# Patient Record
Sex: FEMALE | Race: WHITE | Employment: PART TIME | URBAN - METROPOLITAN AREA
[De-identification: names, ages, dates, MRNs, and addresses within clinical notes are randomized per-mention and may not be internally consistent; named-entity substitution may affect disease eponyms.]

---

## 2017-02-01 NOTE — PROGRESS NOTES
Here for Routine Annual Exam  No concerns or questions. Menses regular, no concerns. Contraception- spouse had vasectomy  No concerns or questions.  Patient and family gong to [place] to ski next month, otherwise just keeping busy with kids activities

## 2018-09-28 NOTE — PROGRESS NOTES
Here for Routine Annual Exam  No concerns or questions. Menses are regular +/- a few days. Contraception- vasectomy  No C/O    ROS: No Cardiac, Respiratory, GI,  or Neurological symptoms.     PE:  GENERAL: well developed, well nourished, in no appar

## 2019-05-09 NOTE — TELEPHONE ENCOUNTER
Called patient from [facility] report to verify if still a Dr Virgil Madsen patient, no answer/ lm on vm.

## 2019-09-30 NOTE — PROGRESS NOTES
Here for Routine Annual Exam  No concerns or questions. Menses are regular, no concern. Contraception- not active. No C/O    ROS: No Cardiac, Respiratory, GI,  or Neurological symptoms.     PE:  GENERAL: well developed, well nourished, in no appare

## 2020-01-24 PROBLEM — N76.4 LEFT GENITAL LABIAL ABSCESS: Status: ACTIVE | Noted: 2020-01-24

## 2020-01-24 NOTE — PROGRESS NOTES
Painful labial cyst, started three days ago  Has had them before  She tried to squeeze it last night, became worse  No fever    ROS: No Cardiac, Respiratory, GI,  or Neurological symptoms.     PE:  Negative node survey  Left labia swollen, erythema all of

## 2020-01-25 NOTE — PROGRESS NOTES
Vulva still swollen  No fever    ROS: No Cardiac, Respiratory, GI,  or Neurological symptoms.     PE:  Left vulva swollen, wick came out  Erythema improved, now limited to vulva    Hot compresses, continue antibiotics    Will return Monday

## 2020-02-03 NOTE — PROGRESS NOTES
Follow up  Much better  Only 1-2 cm induration at original abscess site  No erythema  Finished antibiotics, will refill  x1 and continue

## 2020-08-09 PROBLEM — D64.9 ANEMIA: Status: ACTIVE | Noted: 2020-08-09

## 2020-08-09 PROBLEM — R57.8 HEMORRHAGIC SHOCK (HCC): Status: ACTIVE | Noted: 2020-08-09

## 2020-08-09 PROBLEM — O00.90 RUPTURED ECTOPIC PREGNANCY (HCC): Status: ACTIVE | Noted: 2020-08-09

## 2020-08-09 NOTE — ED INITIAL ASSESSMENT (HPI)
Pt , c/o severe lower abdominal cramping and shoulder pain, mostly right shoulder, and dizzy. Pt took pregnancy test last night and it was positive.  Last menstural period was

## 2020-08-09 NOTE — ANESTHESIA PREPROCEDURE EVALUATION
PRE-OP EVALUATION    Patient Name: Elvia Ayala    Pre-op Diagnosis: Ectopic pregnancy [O00.90]    Procedure(s):  Laparoscopic Salpingectomy possible Oopherectomy for Ectopic pregnancy    Surgeon(s) and Role:     Narcisa Stinson MD - Primary    Pr weekly      Drug use: No     Available pre-op labs reviewed.   Lab Results   Component Value Date    WBC 13.8 (H) 08/09/2020    RBC 3.18 (L) 08/09/2020    HGB 9.9 (L) 08/09/2020    HCT 28.6 (L) 08/09/2020    MCV 89.9 08/09/2020    MCH 31.1 08/09/2020    MCH

## 2020-08-09 NOTE — ANESTHESIA PROCEDURE NOTES
Airway  Date/Time: 8/9/2020 6:00 PM  Urgency: elective      General Information and Staff    Patient location during procedure: OR  Anesthesiologist: Zina Moran MD  Performed: anesthesiologist     Indications and Patient Condition  Indications for ai

## 2020-08-09 NOTE — ED NOTES
Efforts in place to keep PT comfortable, warm blankets, warming lights, increased room temp. Pt given phone to call her Mother, per PT request. Pt remains A&Ox4, breathing non labored, fluids and RBC running through 2 PIV's.  Bilateral side rails elevated &

## 2020-08-09 NOTE — H&P
[address] [facility] Patient Status:  Emergency    1980 MRN [id number]   Location [facility] Attending Drea Hahn MD   Hosp Day # 0 PCP Zaria Beck MD     SUBJECTIVE:    Reason for data in the 24 hours ending 08/09/20 8082    Physical Exam:  General: Alert, orientated x3. .  Vital Signs:  Blood pressure 104/78, pulse 87, temperature 98.4 °F (36.9 °C), temperature source Temporal, resp.  rate 18, height 62\", weight 115 lb (52.2 kg), l to GI and  tracts. All questions were answered.     Naida Garibay  8/9/2020  5:04 PM

## 2020-08-09 NOTE — ANESTHESIA POSTPROCEDURE EVALUATION
[address] Patient Status:  Emergency   Age/Gender 45year old female MRN [id number]   Location [facility] Attending Zuleika Ivy MD   Hosp Day # 0 PCP Ángel Corona DO       Anesthesia Post-op Note

## 2020-08-09 NOTE — ED PROVIDER NOTES
Patient Seen in: [facility] Emergency Department      History   Patient presents with:  Abdomen/Flank Pain    Stated Complaint: positive preg test, lower abdomen pain, appears pale.      HPI    52-year-old woman denies any medical history, here with breath sounds. No wheezing, rhonchi or rales. Abdominal: Diffuse lower abdominal tenderness with rebound tenderness, no guarding.     Skin: Pale, dry  Neurological: Awake alert, speech is normal        ED Course     Labs Reviewed   COMP METABOLIC PANEL (1 results for these tests on the individual orders.    ABORH (BLOOD TYPE)   ANTIBODY SCREEN   PREPARE RBC   PREPARE RBC   RH IMMUNE GLOBULIN   RAINBOW DRAW BLUE   RAINBOW DRAW LAVENDER   RAINBOW DRAW LIGHT GREEN   RAINBOW DRAW GOLD     EKG    Rate, intervals of ruptured ectopic pregnancy with free fluid and debris in the pelvis. O- blood ordered 2 units Case was discussed with OB Dr. Elisa Melendrez was contacted. RhoGam ordered, patient will be admitted for surgery and further evaluation.   Admission dispositio

## 2020-08-10 NOTE — OPERATIVE REPORT
PREOPERATIVE DIAGNOSIS:  Ruptured right ectopic    POSTOPERATIVE DIAGNOSIS:  Same pathology pending, active hemorrhaging right ovary, hemoperitoneum                    PROCEDURE PERFORMED: Operative Laparoscopic removal of Adnexal   Dilation and curettage within the peritoneal cavity without trauma to the underlying viscera. Next a  5 mm port was placed in the left  and a 11 mm port in the right lower quadrants in avascular areas. Plume-Away was then attached to the trocar.       Systemic review of the pe

## 2020-08-10 NOTE — PROGRESS NOTES
[facility]  Progress Note    Gopal Sheffield Patient Status:  Inpatient    1980 MRN [id number]   [facility] 3NW-A Attending Benoit Traylor MD   Hosp Day # 1 PCP Rachel Florentino DO     Subjective:  POD: 1 - feels good.   No va

## 2020-08-10 NOTE — PROGRESS NOTES
Pt is alert and oriented x4. Lungs are clear bilaterally on room air. Bowel sounds are hypoactive. Denies nausea. Tolerating clear liquids at this time. Lap sites x3 to the abdomen. Skin glue CDI. Paint score 2/10. Declining pain meds at this time.  IVF inf

## 2020-08-10 NOTE — BRIEF OP NOTE
Pre-Operative Diagnosis: Ectopic pregnancy [O00.90]     Post-Operative Diagnosis: Ectopic pregnancy [O00.90] pathology pending, possible ovarian ectopic  hemoperitoneum     Procedure Performed:   Procedure(s):  Laparoscopic Salpingooophorectomy for Ectopic

## 2020-08-10 NOTE — DISCHARGE SUMMARY
[facility]  Discharge Summary    Ellen Buckner Patient Status:  Inpatient    1980 MRN [id number]   [facility] 3NW-A Attending Barb Perez MD   Hosp Day # 1 PCP Isela Jason DO     Date of Admission: 2020    Date

## 2020-08-10 NOTE — PROGRESS NOTES
NURSING DISCHARGE NOTE    Discharged Home via Wheelchair. Accompanied by Family member  Belongings Taken by patient/family. PT D/C VIA WHEELCHAIR IN STABLE CONDITION. REVIEWED D/C INSTRUCTIONS W/PT.  LET PT KNOW HER RX WAS E-PRESCRIBED TO HER PHARMA

## 2020-08-10 NOTE — PLAN OF CARE
Problem: Patient/Family Goals  Goal: Patient/Family Long Term Goal  Description  Patient's Long Term Goal: DISCHARGE    Interventions:  - COMMUNICATE ANY NEEDS  -TOLERATE ADVANCED DIET  -AMBULATE TID  - See additional Care Plan goals for specific interve

## 2020-08-11 NOTE — PAYOR COMM NOTE
--------------  PLEASE FAX INPT DAYS AUTHORIZED ALONG WITH NRD [phone]    [facility] YOU    ADMISSION REVIEW     Payor: [facility]  Subscriber #:  [id number]  Authorization Number: [id number]    Admit date: 8/9/20  Admit time: 2006       Admitting Physi Vitals signs and nursing note reviewed. General: Ill-appearing pale young woman sitting in the bed   head: Normocephalic and atraumatic. HEENT:  Mucous membranes are moist.   Cardiovascular:  Normal rate and regular rhythm.   No Edema  Pulmonary:  Pulmo This is a 29-year-old woman here with diffuse lower abdominal pain fairly sudden onset earlier this morning she is 5 weeks pregnant. On arrival she is hypotensive to 80s over 40s, she is pale and ill-appearing.   Bedside ultrasound shows free fluid in ASKIM HEENT: Exam is unremarkable. Without scleral icterus. Mucous membranes are moist. Pupils are equal and round, reactive to light and accommodate. Oropharynx is clear. Neck: No tenderness to palpitation.   Full range of motion to flexion and extension, la ESTIMATED BLOOD LOSS:  10 cc 3000cc hemoperitoneum     Urine output 400cc     INDICATIONS:  See H&P     FINDINGS:  3000cc hemoperitoneum, normal appearing tubes, actively hemorrhaging right ovary, normal left ovary, normal uterus  Uterus sounded to 9 cm    Systemic review of the pelvic anatomy was performed. The lower and upper pelvis was filled with blood. Suction was done and 3000 cc was removed. At this time survey of the pelvis revealed a normal uterus,normal left tube and ovary.  Normal appearing right t Vital Signs:  Blood pressure 98/40, pulse 72, temperature 97.9 °F (36.6 °C), temperature source Oral, resp. rate 17, height 62\", weight 115 lb (52.2 kg), last menstrual period 07/05/2020, SpO2 98 %, not currently breastfeeding. Abdomen:   Bowel sounds p

## 2020-08-11 NOTE — PAYOR COMM NOTE
--------------  DISCHARGE REVIEW    Payor: [employer]  Subscriber #:  [id number]  Authorization Number: [id number]    Admit date: 8/9/20  Admit time:  2006  Discharge Date: 8/10/2020 12:15 PM     Admitting Physician: Nilo Taylor MD  Attending Physic mouth.    Probiotic Product (PROBIOTIC-10) Oral Cap  Take 1 tablet by mouth daily. Vitamin C 500 MG Oral Tab  Take 500 mg by mouth daily. MULTIVITAMINS OR  None Entered          Diet:  General      Follow up Visits:  Follow-up in 1 weeks      Other Emerita Roberson

## 2020-08-17 NOTE — PROGRESS NOTES
Incision clean dry intact    Procedure reviewed with pt  Pathology reviewed    Feeling better  To have bhcg drawn this week    Interested in Michele  Had annual exam in November    Will wait until normal menses  Will call for paraguard placement

## 2020-09-05 NOTE — TELEPHONE ENCOUNTER
Patient states that she saw Dr. Narayan Phillips and she was going to order labs for her to get done. Patient called to set up an appt with lab and they told her there was no order in the computer for her to get it done.

## 2020-09-08 NOTE — TELEPHONE ENCOUNTER
Patient states that her incision site from surgery is healed but there are white strings sticking out.  Please call to advise

## 2020-09-08 NOTE — TELEPHONE ENCOUNTER
Patient informed that lab order for HCG is in system. She stated she will call back and let us know if she is not able to get through. No further questions or concerns.

## 2020-09-08 NOTE — TELEPHONE ENCOUNTER
Spoke with patient. She stated that her incision has a white string poking out. She denies any s/s of infection. Incision is clean, dry, intact, no drainage, no oozing. Per Dr. Ronny Tate, just most likely suture.  Advised patient that at appointment with German Alonzo, if

## 2020-09-21 NOTE — TELEPHONE ENCOUNTER
If her bleeding doesn't taper in the next week I suggest she come in for evaluation. I agree if it becomes heavy she is to call or with any concern for pain.

## 2020-09-21 NOTE — TELEPHONE ENCOUNTER
Patient had ruptured right ectopic last month and had right salpingoophorectomy and D&C on 08/09/2020. She stated 2 weeks ago she got her first menses since procedure. She stated she is still bleeding. It varies in severity.  Sometimes it is light spotting

## 2020-09-21 NOTE — TELEPHONE ENCOUNTER
Contacted patient. Advised as noted by German Alonzo. Questions answered and patient states understanding.

## 2020-11-02 NOTE — PROGRESS NOTES
Here for Routine Annual Exam  Menses have been normal by cycles are closer together. Contraception- none noted, awaiting menses to have Paragard IUD placed. No C/O    ROS: No Cardiac, Respiratory, GI,  or Neurological symptoms.     PE:  GENERAL: wel

## 2020-11-10 NOTE — TELEPHONE ENCOUNTER
Patient just got her menses today. She needs to schedule paragard insertion. Appointment added with Pooja Malloy tomorrow in [place] as she has a full 30 minutes in acute slot. Patient screened and PSR notified.

## 2020-11-10 NOTE — TELEPHONE ENCOUNTER
This patient needs her IUD inserted this week. She saw Luis Eduardo Almendarez recently and was told to check with the nurses if the PSRs could not find an appt for her. Please call. The only opening I saw was the last appt of the day, which we were told not to use.

## 2020-11-11 NOTE — TELEPHONE ENCOUNTER
Left message for patient to call back as soon as possible. Per Nafisa Connelly we need to wait for patient's genotyping to come back to see if she will need colpo prior to IUD insertion.   Spoke with Cha Pyle in the lab and they were not able to give an estimate of whe

## 2020-11-11 NOTE — TELEPHONE ENCOUNTER
Spoke with patient. Informed her that we are waiting for her HPV 16/18/45 results to come back. This was patient's first abnormal.   Results explained to patient as well as potential for colposcopy. She verbalized understanding.    I informed we will ca

## 2020-11-13 NOTE — PROGRESS NOTES
Patient informed that it is taking longer to process HPV. She verbalized understanding.   Advised we will follow up Monday

## 2020-11-19 NOTE — TELEPHONE ENCOUNTER
Received call from patient requesting results. Let patient know that there continues to be a delay in processing and the results are not available yet. Apologized for the delay.  Patient desires to be able to have IUD placed and is anxious to get these resu

## 2020-11-19 NOTE — PROGRESS NOTES
Contacted patient and reported results. She states understanding and will call to schedule appt for IUD when she starts her next menses. HM updated to 1 year for repeat pap.   Copy to pap pool

## 2020-12-11 NOTE — PROGRESS NOTES
S: Procedure: The patient was consented for Paragard placement. Risks and benefits were discussed including infection, uterine perforation, uterine expulsion, PID, ectopic and intrauterine pregnancy. All questions were answered.     O:  The patient was

## 2020-12-21 NOTE — TELEPHONE ENCOUNTER
44 y/o called c/o spotting. She had Paragard inserted on 12/11/2020. She stated she has been spotting since procedure. She denies any heavy bleeding. Denies any severe pain. Some mild cramping.    Last OV date: 12/11/2020  Recent Test/Labs: NA   Recommendat

## 2021-01-08 NOTE — PROGRESS NOTES
Gyne note     S: patient is a 45year old yo [id number] here for a follow up after having a Paragard IUD placed. She notes the first 3 weeks she had a heavier spotting on a panty liner with more increased cramps.  This last week her spotting is just with wipin

## 2022-08-22 NOTE — TELEPHONE ENCOUNTER
Procedures: operative laparoscopy, right salpingooophorectomy, evacuation of massive hemoperitoneum, dilation and curettage    Patient calling and would like to know how her previous surgery could play a role in getting pregnant again. She is aware she can still get pregnant but would like to know if she's at a higher risk for anything in particular.

## 2022-08-23 NOTE — TELEPHONE ENCOUNTER
Called patient and she does not want to make and appointment weeks out for just a couple of questions.  She states it would be a 2 minute conversation

## 2022-08-23 NOTE — TELEPHONE ENCOUNTER
Notified patient of Radha's message. Patient verbalized understanding in knowing she can get pregnant, currently has a copper IUD and will get removed when she is ready to start trying.

## 2022-08-23 NOTE — TELEPHONE ENCOUNTER
She could be at a slightly higher risk for ectopic. We can always order testing to check her tubes prn. If her tube is blocked she may not be able to get pregnant on that side in the months she ovulates from that side but it is hard to know which side a patient ovulates from.

## 2023-01-09 NOTE — IMAGING NOTE
This Breast Care RN assisted Dr. Collette Spray with recommendation for a left breast 1 site ultrasound guided biopsy for calcifications. Procedure reviewed and all questions answered. Emotional and educational support given. On the day of the biopsy, pt instructed to take Tylenol 1000mg PO, eat a light meal & bring or wear a sports bra. Post biopsy care also reviewed with pt to include NO lifting more than 5lbs, no exercising or housework (limit upper body movement) for 24-48 hrs post biopsy. Patient denies blood thinners, bleeding disorders, liver disease, chemo, and pregnancy. Pt verbalized understanding. Our breast center schedulers will be calling to schedule an appt that is convenient for pt.

## 2023-01-19 NOTE — IMAGING NOTE
1607: Spoke with Martine Calloway post ultrasound guided left breast biopsy. Introduced myself as breast care coordinator. Name and date of birth verified by patient. Reinforced post biopsy care and instruction. Ms. Na Flowers denies any issues with biopsy site- bleeding, drainage, redness, tenderness. Pathology results and recommendations shared as follows:   Left Breast  -Portion of radial scar   Fibrocystic changes. Concordance pending. Recommendation- surgical referral    Dr. Clemencia Cavanaugh's office referring to Dr. Juan Alberto Maier. Ms. Lacy Renetta instructed to make an appointment with Dr. Emerson Hutton. Dr. Vick Alvarado office phone number provided. Martine Calloway verbalized understanding and agreement to the above.

## 2023-01-19 NOTE — TELEPHONE ENCOUNTER
Had breast biopsy. Results demonstrate a radial scar, so they are referring patient to surgery for removal. Breast center nurse to call patient and notify her. Please review pathology results.

## 2023-01-19 NOTE — IMAGING NOTE
1550: Spoke with LENKA Coleman in Dr. Darrian Parker office. Discussed pathology results for Buster Garg left breast biopsy performed Wednesday, January 18 as follows:  Left breast portions of radial scar  See EMR for full pathology report    Recommendation- surgical referral.  Per RN Molly Trammell- Dr. Real Grate referring to Dr. Jian Martino.     Informed Odilia Coleman that this breast care coordinator would discuss pathology results and radiologist recommendation with Roxanna Aranda to report pathology as above to Dr. Josué Sharp

## 2023-02-09 PROBLEM — N64.89 RADIAL SCAR OF LEFT BREAST: Status: ACTIVE | Noted: 2023-02-09

## 2023-02-13 NOTE — TELEPHONE ENCOUNTER
80: Spoke with Tiki Aldrich at this time. Discussed localization procedure to be done in the [facility] imaging center prior to surgery. Procedure and flow of the day explained. All questions answered. Tiki Aldrich verbalized understanding and gratitude for the call.

## 2023-02-14 NOTE — TELEPHONE ENCOUNTER
LEFT LUMPECTOMY WITH X-RAY LOCALIZATION 3/2/23. Pt called stating she no longer has a ride for the date of her surgery and wants to know if she will be ok to push the surgery back or not.

## 2023-02-15 NOTE — TELEPHONE ENCOUNTER
S/w Dr. Rod Saucedo in regards to pts concerns she does not have a  for her surgery. Dr. Rod Saucedo recommends to have surgery within the next 4-6 weeks, however nothing urgent    Called and updated pt.  Transferred to surgery scheduler to cancel

## 2023-03-23 NOTE — DISCHARGE INSTRUCTIONS
Home Care Instructions for Breast Surgery  Dr. Leila Jones  For post-operative pain control the mediations are usually over the counter preparations such as Advil and Tylenol. For severe pain the patient may take the prescribed Tylenol #3, which is a narcotic pain medication. The patient may also overlap Advil with Tylenol. The patient can do this by taking two Tylenol, then three hours later taking two Advil, then three hours later taking Tylenol again. All home medications may be resumed as scheduled. DIET  The patient may resume a general diet immediately. There should be no alcohol consumption in the immediate recovery time period. If the patient was sedated for the procedure the first meal should be light. WOUND CARE  The top dressings may be removed the day after surgery. This includes the gauze, tape and band-aids if they are present. Do not remove the steri-strips or butterfly tapes that are white and adherent to the skin. The steri-strips will eventually peel up at the ends and at this point they may be removed. This is usually seven to ten days after surgery. The patient may shower the day after surgery. There is no need to cover the incisions and all top gauze type dressings should be removed prior to showering. Soap can get on the wounds but do not scrub over the wounds. No hair dye or chemicals of any kind should get in the wounds. Most wounds will be closed with dissolving suture underneath the skin. These sutures will dissolve on their own. ACTIVITY  The patient may ride in a car but should not drive the car for at least overnight if sedation was used. If no sedation was used the patient may drive immediately. The patient may return to work the next day. Avoid any activity that could lead to the breast getting elbowed or bumped. Avoid bending, pushing, pulling and lifting anything heavier than 25 pounds for two weeks.   No jogging or workouts for two weeks. Fast walking and using a treadmill at less than 3.5 miles per hour in the flat position is acceptable. Patients should seek further activity limits at the time of their appointment. Patients should wear a supportive bra, even at night, for two weeks. The best support is with a sports bra. No golfing, tennis, racquetball, volleyball, or extreme sports for two weeks. APPOINTMENT  I will ask the nurses to call you to help set up a follow-up appointment. If the wound turns red, hot, swollen, becomes increasingly painful, or drains pus call us immediately at [phone]. Bleeding requiring a return to the operating room happens two to three percent of the time. Minor bleeding from the incision is expected. A significant bruise can also be expected. Severe swelling of the breast with pain, bluish discoloration, or the passage of blood clots through the incision is an indication for bleeding. The number listed above is our office number. Our phone automatically switches to our answering service if we are not there. For non-emergent care please call our office at 8:30 a.m. Monday through Friday. For emergencies please call us at any time. Thank you for entrusting us with your care.   [facility]--General Surgery

## 2023-03-23 NOTE — IMAGING NOTE
Assisted  with ultrasound guided  needle localization of the left breast.   Karely Chung identified with spelling of name and date of birth. Medications and allergies reviewed. NKDA reported. History:  radial scar- left breast  Surgery: LEFT LUMPECTOMY WITH X-RAY LOCALIZATION    Order verified. Procedure explained and questions answered. Karely Chung verbalized understanding and agreement. 8805: Written consent obtained by imaging staff. 0740: Scans taken by Chaz Reveles- ultrasound technologist    7176: Dr. Jennifer Ennis  present    7815: Time out complete. 0356: Site prepped and draped in a sterile manner. 3900: Lidocaine administered for anesthetic affect. 1186: [place] 20G x 5cm needle placed- left breast 6 o'clock 9 CFN  Emotional support provided. Karely Chung tolerated procedure well. Site cleaned. Wire secured with sterile 4x4 gauze dressing, Transpore tape, and a styrofoam cup. Karely Chung transported via wheelchair to mammography for post localization images in stable condition. Ms. Robert Chambers without complaints or concerns at this time. 0805: Report and transfer of care to mammography technologist- Valeda Goodpasture. Mammography staff to discharge Ms. Robert Chambers to surgery holding after images complete.

## 2023-03-23 NOTE — OPERATIVE REPORT
[facility]  Op Note    Sandra Bonilla Location: OR   [facility] [id number] MRN [id number]   Admission Date 3/23/2023 Operation Date 3/23/2023   Attending Physician Pearl Soto MD Operating Physician Red Villafuerte MD     DATE OF OPERATION:  3/23/2023    PREOPERATIVE DIAGNOSIS: Left breast radial scar    POSTOPERATIVE DIAGNOSIS: Left breast radial scar    PROCEDURE PERFORMED: X-ray localized left breast lumpectomy. SURGEON:  Red Villafuerte M.D.    ASSISTANT: Geo Biggs PA-C (Her assistance was required to help retract the tissue for adequate dissection and resection of the appropriate tissue. She also helped with wound closure.)    ANESTHESIA:  MAC    SPECIMEN: Left breast tissue to Pathology. ESTIMATED BLOOD LOSS: 3 cc    COMPLICATIONS: None. INDICATIONS: The patient is a 81-year-old female who was found to have increasing calcifications at the 6 o'clock position of the left breast.  Biopsy revealed a radial scar. Because of the risk of upstaging, she was offered excision of this area. The risks, benefits, alternatives were discussed in detail with the patient. Risks included, but were not limited to, seroma development, infection and bleeding. We discussed the possibility of upstaging of her pathology which would require surgery on another day. We also discussed the possibility of injury to her implant. She was agreeable to proceed with the operation. PROCEDURE: After informed consent was obtained, the patient was taken to the radiology suite where a wire was placed to localize her breast lesion. She was then brought up to the operating room where anesthesia was induced. The patient's left breast was prepped and draped in the usual sterile fashion. The tissue along her inframammary scar was locally anesthetized with 0.5% Marcaine with epinephrine. An incision was made through the prior scar with a 15 blade scalpel. Cautery was used to obtain hemostasis.  The tissue that encircled the wire was then grasped. Metzenbaums were used to sharply dissect this tissue free. Care was taken to avoid the implant and capsule. The wire was delivered into the wound, and the tissue ultimately excised. The specimen was marked long stitch lateral, short stitch superior. Faxitron image revealed the clip within the tissue. Cautery was then used to obtain hemostasis. The wound was irrigated with normal saline. The incision was then closed in 2 layers, using a 3-0 Vicryl in the deep layer and a 4-0 Vicryl in the subcuticular skin. Steri-Strips were placed across the incision as well as a sterile dressing. The patient tolerated the procedure well and was transferred to the PACU in good condition.     Codie Eid MD

## 2023-04-04 NOTE — PROGRESS NOTES
Dx: Radial scar or left breast     Patient presents to clinic for breast follow up. X-ray localized left breast lumpectomy performed on 3/23/23. Radial scar (up to 0.6 cm), with adjacent prior to biopsy site. Background breast tissue with pseudoangimoatous stromal hyperplasia, columnar cell alteration, sclerosing adenosis and microcalcifications. Margins negative for malignancy. Patient reports lumpectomy site healing well. Some intermittent tenderness. Denies any redness, swelling, itching, fever, or chills. . No bleeding or discharge. Medication and allergies reviewed and updated.

## 2023-05-01 NOTE — TELEPHONE ENCOUNTER
S/W pt she states there is like a string/thread coming out of her incision she had a lumpectomy 03/23 with [facility] #[phone]

## 2023-05-01 NOTE — TELEPHONE ENCOUNTER
S/w pt, she states she feels a \"string like\" coming out of her incision. Denies any s/s infection.   appt made for wound check with PA 5/2

## 2023-11-09 NOTE — TELEPHONE ENCOUNTER
Patient has questions regarding US results. She is wondering if the cysts are a concern? Will they grow? Will they get in the way of getting pregnant?   Please call to advise

## 2023-11-09 NOTE — TELEPHONE ENCOUNTER
Reviewed US result note with patient. TRUONG Lee  11/9/2023 12:51 PM CST       IUD in place, small cysts. With continued pain she could repeat US in 3 months. Discussed that ovarian cysts typically resolve on their own and should not affect pregnancy. Patient is aware that IUD would need to be removed prior to trying to conceive. Patient states she will call back to schedule appointment.

## 2023-11-29 NOTE — PROCEDURES
Procedure Note: IUD removal     Preoperative Diagnosis:  IUD in place     Postoperative Diagnosis:  S/p IUD removal     Indications:  44year old y/o  female with Paragard IUD in placed since 2020 who presents for IUD removal per patient request.     Procedure:    A discussion was held with the patient about risks, benefits and alternatives for the procedure. The patient verbalized understanding and requested IUD removal. All questions and concerns addressed. Verbal and written consent was obtained. The patient was placed in dorsal position with heels secured in stirrups. A sterile speculum was placed in the vagina. The cervix was visualized with 2 visible IUD strings noted from at the external os. Sterile ring forceps were then advanced towards the cervix and used to grasp the IUD strings. The IUD was then removed with the sterile forceps without difficulty or complications. The IUD appeared intact and was shown to the patient prior to be properly discarded. The patient reported she was doing well. All instrument were then removed from the vagina. Precautions provided to the patient. Pt and partner desire pregnancy. The patient was advised to return to clinic for a well woman exam or sooner if needed.      Condition: Stable    Disposition: RTC for a well woman exam or sooner if needed

## 2023-11-29 NOTE — PROGRESS NOTES
Subjective:  44year old [id number]   Chief Complaint   Patient presents with    Procedure     IUD removal     Pt here today requesting removal of IUD    Review of Systems:  Pertinent items are noted in the HPI. Objective:  /70   Pulse 72   Resp 18   Wt 111 lb (50.3 kg)   LMP 09/24/2023 (Approximate)     Physical Examination:  General appearance: Well dressed, well nourished in no apparent distress  Neurologic/Psychiatric: Alert and oriented to person, place and time, mood normal, affect appropriate  Abdomen: Soft, non-tender, non-distended, no masses, no hepatosplenomegaly, no hernias, no inguinal lymphadenopathy  Pelvic:    External genitalia- Normal, Bartholin's, urethra, skeins glands normal   Vagina- No vaginal lesions, small menstrual blood in vaginal vault   Cervix- No lesions, long/closed, no cervical motion tenderness   Uterus- Normal, non-tender, no masses   Adnexa-  Non-tender, no masses    Assessment/Plan:      Diagnoses and all orders for this visit:    Encounter for removal of intrauterine contraceptive device  -     Removal of IUD [19737]        Return for annual well woman exam or sooner if needed.

## 2024-03-18 NOTE — TELEPHONE ENCOUNTER
Pt called stating she is trying to conceive and said she has been testing for ovulation and said she has tested negative so far until yesterday and today but stated she got her cycle today and is confused by it. Please advise.

## 2024-03-19 NOTE — TELEPHONE ENCOUNTER
Patient notified by [employer] that appointment is required.  Phone number provided for scheduling.

## 2024-04-02 ENCOUNTER — OFFICE VISIT (OUTPATIENT)
Dept: OBGYN CLINIC | Facility: CLINIC | Age: 44
End: 2024-04-02
Payer: COMMERCIAL

## 2024-04-02 VITALS
SYSTOLIC BLOOD PRESSURE: 116 MMHG | BODY MASS INDEX: 21 KG/M2 | HEART RATE: 67 BPM | WEIGHT: 114 LBS | DIASTOLIC BLOOD PRESSURE: 84 MMHG

## 2024-04-02 DIAGNOSIS — N97.9 FEMALE INFERTILITY: Primary | ICD-10-CM

## 2024-04-02 PROCEDURE — 99213 OFFICE O/P EST LOW 20 MIN: CPT | Performed by: NURSE PRACTITIONER

## 2024-04-02 PROCEDURE — 3079F DIAST BP 80-89 MM HG: CPT | Performed by: NURSE PRACTITIONER

## 2024-04-02 PROCEDURE — 3074F SYST BP LT 130 MM HG: CPT | Performed by: NURSE PRACTITIONER

## 2024-04-02 NOTE — PROGRESS NOTES
Subjective:  43 year old    Chief Complaint   Patient presents with    Other     Cycle is changing, testing ovulation questions, testing ovulation and it is off      Pt here today, with her partner, to discuss trying to conceive  She had her paragard IUD removed 2023  Notes that they have had unprotected intercourse since IUD removal but only over the past couple of months has been tracking menses and ovulation  Was using dip strips and never got a peak fertility.  Went to digital ovulation kit and got high fertility for 2 days but the started menses  This past month had peak fertility on day 8 of cycle  She is getting a monthly period, the past 2 months have been 28 days  Pt has history of ectopic pregnancy   Otherwise healthy  Review of Systems:  Pertinent items are noted in the HPI.    Objective:  /84   Pulse 67   Wt 114 lb (51.7 kg)   LMP 2024 (Exact Date)       Physical Examination:  General appearance: Well dressed, well nourished in no apparent distress  Neurologic/Psychiatric: Alert and oriented to person, place and time, mood normal, affect appropriate    Assessment/Plan:      Diagnoses and all orders for this visit:    Female infertility  -     FSH; Future  -     Estradiol; Future  -     ANTI-MULLERIAN HORMONE ([facility]), FEMALE [[id number]][Q]  -     Progesterone; Future  -     TSH W Reflex To Free T4; Future  - discussed day 3 labs  - we also discussed HSG  - discussed SA for partner and timed intercourse   - offered referral to reproductive endocrine, pt declines at this time  - to follow up with questions or concerns    Return if symptoms worsen or fail to improve.

## 2024-04-25 ENCOUNTER — LAB ENCOUNTER (OUTPATIENT)
Dept: LAB | Facility: HOSPITAL | Age: 44
End: 2024-04-25
Attending: NURSE PRACTITIONER
Payer: COMMERCIAL

## 2024-04-25 ENCOUNTER — TELEPHONE (OUTPATIENT)
Dept: OBGYN CLINIC | Facility: CLINIC | Age: 44
End: 2024-04-25

## 2024-04-25 DIAGNOSIS — N97.9 FEMALE INFERTILITY: Primary | ICD-10-CM

## 2024-04-25 LAB
ESTRADIOL SERPL-MCNC: 24.3 PG/ML
FSH SERPL-ACNC: 37.9 MIU/ML
PROGEST SERPL-MCNC: 0.58 NG/ML
T4 FREE SERPL-MCNC: 0.8 NG/DL (ref 0.8–1.7)
TSI SER-ACNC: 4.48 MIU/ML (ref 0.36–3.74)

## 2024-04-25 PROCEDURE — 84443 ASSAY THYROID STIM HORMONE: CPT

## 2024-04-25 PROCEDURE — 84144 ASSAY OF PROGESTERONE: CPT

## 2024-04-25 PROCEDURE — 84439 ASSAY OF FREE THYROXINE: CPT

## 2024-04-25 PROCEDURE — 82670 ASSAY OF TOTAL ESTRADIOL: CPT

## 2024-04-25 PROCEDURE — 83001 ASSAY OF GONADOTROPIN (FSH): CPT

## 2024-04-25 PROCEDURE — 36415 COLL VENOUS BLD VENIPUNCTURE: CPT

## 2024-04-25 PROCEDURE — 83520 IMMUNOASSAY QUANT NOS NONAB: CPT

## 2024-04-25 NOTE — TELEPHONE ENCOUNTER
Patient had labs done today- results are still in process.  Patient notified that we will contact her once her labs are available and have been reviewed by the ordering provider.  Patient verbalized understanding.

## 2024-04-28 LAB — MULLERIAN AMH: 0.1 NG/ML

## 2024-04-30 ENCOUNTER — TELEPHONE (OUTPATIENT)
Dept: OBGYN CLINIC | Facility: CLINIC | Age: 44
End: 2024-04-30

## 2024-05-01 ENCOUNTER — PATIENT MESSAGE (OUTPATIENT)
Dept: OBGYN CLINIC | Facility: CLINIC | Age: 44
End: 2024-05-01

## 2024-05-02 NOTE — TELEPHONE ENCOUNTER
From: Love Mar  To: Garima Lozano  Sent: 5/1/2024 11:14 AM CDT  Subject: Test Results/Doctor Referral    Julius Painting,  I spoke with a nurse today regarding my test results and she said that next steps are to make an appointment with a reproductive endocrinologist. I asked for a referral so that I am not just choosing blindly - I'd like to see someone who is recommended to me, rather than someone random. Are you able to recommend a doctor that you like and trust and have seen results with? I'd really appreciate it.   Thank you,  Love Mar  [phone]

## 2024-05-06 ENCOUNTER — TELEPHONE (OUTPATIENT)
Dept: OBGYN CLINIC | Facility: CLINIC | Age: 44
End: 2024-05-06

## 2024-07-22 ENCOUNTER — TELEPHONE (OUTPATIENT)
Dept: OBGYN CLINIC | Facility: CLINIC | Age: 44
End: 2024-07-22

## 2024-07-29 ENCOUNTER — TELEPHONE (OUTPATIENT)
Facility: LOCATION | Age: 44
End: 2024-07-29

## 2024-07-29 NOTE — TELEPHONE ENCOUNTER
Please phone to schedule WWE soonest available.   Patient requesting results of latest gonorrhea and chlamydia labs as well as notes from last WWE faxed to infertility dr. Explained no recent STD labs on file. Pap and HPV results from 3/3/23 as time of last WWE. States she was under the impression that she had since had a WWE and that STD labs were automatically a part of this testing. Requesting to schedule WWE asap.

## 2024-07-29 NOTE — TELEPHONE ENCOUNTER
The patient recently called stating they need a note in order to get a procedure done, but  is no longer a doctor of our office and the patient did not know. The patient stated that she need the note as soon as possible or the other doctor will not do the procedure needed.     Call back # [phone]

## 2024-07-30 ENCOUNTER — TELEPHONE (OUTPATIENT)
Facility: LOCATION | Age: 44
End: 2024-07-30

## 2024-07-30 NOTE — TELEPHONE ENCOUNTER
S/w patient, patient advised that requested letter was sent via [employer].  Letter was also faxed to [phone].  Patient expressed gratitude.

## 2024-08-01 ENCOUNTER — TELEPHONE (OUTPATIENT)
Facility: LOCATION | Age: 44
End: 2024-08-01

## 2024-08-01 NOTE — TELEPHONE ENCOUNTER
S/w patient who states IVF clinic denied receiving the requested letter via fax on 7/30.  I received the phone number to that office from patient and called to confirm contact info.  S/W staff from [phone] IVF clinic who confirmed that Letter requested by patient was indeed received 7/30 ---the day it was faxed by our office. Staff stated that the letter was uploaded into their system the same day.  Staff also stated that she would inform the nursing staff of this.

## 2024-08-07 ENCOUNTER — TELEPHONE (OUTPATIENT)
Dept: OBGYN CLINIC | Facility: CLINIC | Age: 44
End: 2024-08-07

## 2024-08-07 ENCOUNTER — OFFICE VISIT (OUTPATIENT)
Dept: OBGYN CLINIC | Facility: CLINIC | Age: 44
End: 2024-08-07
Payer: COMMERCIAL

## 2024-08-07 VITALS — WEIGHT: 113 LBS | SYSTOLIC BLOOD PRESSURE: 110 MMHG | BODY MASS INDEX: 21 KG/M2 | DIASTOLIC BLOOD PRESSURE: 70 MMHG

## 2024-08-07 DIAGNOSIS — Z12.4 CERVICAL CANCER SCREENING: Primary | ICD-10-CM

## 2024-08-07 DIAGNOSIS — Z11.3 ROUTINE SCREENING FOR STI (SEXUALLY TRANSMITTED INFECTION): ICD-10-CM

## 2024-08-07 DIAGNOSIS — Z01.419 WELL WOMAN EXAM WITH ROUTINE GYNECOLOGICAL EXAM: ICD-10-CM

## 2024-08-07 PROCEDURE — 87624 HPV HI-RISK TYP POOLED RSLT: CPT | Performed by: STUDENT IN AN ORGANIZED HEALTH CARE EDUCATION/TRAINING PROGRAM

## 2024-08-07 PROCEDURE — 87591 N.GONORRHOEAE DNA AMP PROB: CPT | Performed by: STUDENT IN AN ORGANIZED HEALTH CARE EDUCATION/TRAINING PROGRAM

## 2024-08-07 PROCEDURE — 99396 PREV VISIT EST AGE 40-64: CPT | Performed by: STUDENT IN AN ORGANIZED HEALTH CARE EDUCATION/TRAINING PROGRAM

## 2024-08-07 PROCEDURE — 3078F DIAST BP <80 MM HG: CPT | Performed by: STUDENT IN AN ORGANIZED HEALTH CARE EDUCATION/TRAINING PROGRAM

## 2024-08-07 PROCEDURE — 3074F SYST BP LT 130 MM HG: CPT | Performed by: STUDENT IN AN ORGANIZED HEALTH CARE EDUCATION/TRAINING PROGRAM

## 2024-08-07 PROCEDURE — 87491 CHLMYD TRACH DNA AMP PROBE: CPT | Performed by: STUDENT IN AN ORGANIZED HEALTH CARE EDUCATION/TRAINING PROGRAM

## 2024-08-07 RX ORDER — ESTRADIOL 1 MG/1
TABLET ORAL
COMMUNITY
Start: 2024-08-02

## 2024-08-07 RX ORDER — LEVOTHYROXINE SODIUM 88 UG/1
TABLET ORAL
COMMUNITY
Start: 2024-07-22

## 2024-08-07 NOTE — TELEPHONE ENCOUNTER
Patient filled out SHAHZAD at annual office visit. Will be starting IVF and needs pap/breast and pelvic/chlamydia/gonorrhea culture records sent to Dr Braydon Witt when results are in. Completed SHAHZAD in [place].

## 2024-08-07 NOTE — PROGRESS NOTES
Annual Exam (Ages 40-49)    Subjective:    This is a 44 year old  presenting for routine annual exam    Last pap: 3/6/2023 NILM HPV Neg    Is going to start IVF. Needs STI screening    Review of Systems   Constitutional: Negative.    HENT: Negative.    Respiratory: Negative.    Gastrointestinal: Negative.    Endocrine: Negative.    Genitourinary: Negative.    Musculoskeletal: Negative.    Skin: Negative.    Allergic/Immunologic: Negative.    Neurological: Negative.      Objective:    Allergies   Allergen Reactions    Latex SWELLING     History reviewed. No pertinent past medical history.    Current Outpatient Medications:     levothyroxine 88 MCG Oral Tab, , Disp: , Rfl:     estradiol 1 MG Oral Tab, , Disp: , Rfl:     sertraline 50 MG Oral Tab, Take 1 tablet (50 mg total) by mouth daily., Disp: , Rfl:     Cyanocobalamin (VITAMIN B 12 OR), Take by mouth., Disp: , Rfl:     VITAMIN D-VITAMIN K OR, Take by mouth., Disp: , Rfl:     Probiotic Product (PROBIOTIC-10) Oral Cap, Take 1 tablet by mouth daily., Disp: , Rfl:     Vitamin C 500 MG Oral Tab, Take 1 tablet (500 mg total) by mouth daily., Disp: , Rfl:     MULTIVITAMINS OR, None Entered, Disp: , Rfl:   Past Surgical History:   Procedure Laterality Date    Implantable breast prosthesis  2016    Laparoscopic salpingostomy  2020    Geovanni localization wire 1 site left (cpt=19281) Left 2023    RADIAL SCAR 6:00    Needle biopsy left Left 2023    6:00- RADIAL SCAR     Family History   Problem Relation Age of Onset    Cancer Maternal Grandmother     Diabetes Brother       reports that she has never smoked. She has never used smokeless tobacco. She reports current alcohol use. She reports that she does not use drugs.    Physical Exam     Vitals:    24 1434   BP: 110/70        Constitutional: She is oriented to person, place, and time. She appears well-developed and well-nourished.   Cardiovascular: normal peripheral perfusion  Pulmonary/Chest: non  labored breathing  Breasts: Examined sitting and supine. No cervical, supraclavicular or axillary adenopathy. No masses. Right breast excoriations and minimal erythema from pruritus that she had last night. Scars from implants.  Abdominal: Soft.   Genitourinary: Normal appearing external genitalia. Vagina is well estrogenized. Normal appearing urethral meatus. Bartholin's gland normal to palpation. Normal appearing multiparous cervix. Cervix is not friable and with normal appearing discharge. Uterus is 6 weeks size  and non tender. No cervical motion tenderness. Normal adnexa bilaterally without tenderness.  Neurological: She is alert and oriented to person, place, and time.     Assessment and Plan  This is a 44 year old  presenting for annual exam.     #Annual Exam  -Depression screening   Depression Screening (PHQ-2/PHQ-9): Over the LAST 2 WEEKS   Little interest or pleasure in doing things: Not at all    Feeling down, depressed, or hopeless: Not at all    PHQ-2 SCORE: 0      -Blood pressure screening normal  -Body mass index is 20.67 kg/m².   -STD screening off pap  -Pap smear collected  -Mammogram already scheduled      Hans Betancur MD

## 2024-08-08 LAB
C TRACH DNA SPEC QL NAA+PROBE: NEGATIVE
HPV E6+E7 MRNA CVX QL NAA+PROBE: NEGATIVE
N GONORRHOEA DNA SPEC QL NAA+PROBE: NEGATIVE

## 2024-08-13 LAB
.: NORMAL
.: NORMAL

## 2025-06-17 ENCOUNTER — TELEPHONE (OUTPATIENT)
Dept: OBGYN CLINIC | Facility: CLINIC | Age: 45
End: 2025-06-17

## 2025-06-17 NOTE — TELEPHONE ENCOUNTER
Received mammogram results from [facility].  Placed in nurse bin in [place] for review

## 2025-06-18 NOTE — TELEPHONE ENCOUNTER
Thermography noted for low risk but not \"within normal limits or negative.\" They also advised it is not stand alone testing and does not replace mammogram or any diagnostic device or examination. It is adjunctive to mammogram.

## 2025-06-18 NOTE — TELEPHONE ENCOUNTER
Faxed results to [place] where is Rahda today and tomorrow for her to review.  Will put original in her box here in [place].

## 2025-06-20 ENCOUNTER — MED REC SCAN ONLY (OUTPATIENT)
Dept: OBGYN CLINIC | Facility: CLINIC | Age: 45
End: 2025-06-20

## (undated) NOTE — LETTER
Piyush Medina, :1980    CONSENT FOR PROCEDURE/SEDATION    1. I authorize the performance upon Piyush Medina  the following: Paragard insertion    2.  I authorize TRUONG Ruffin (and whomever is designated as the doctor’s assistant), to Witness: _________________________________________ Date:___________     Physician Signature: _______________________________ Date:___________

## (undated) NOTE — Clinical Note
I had the pleasure of seeing Claudine Geiger on 2/8/2023. Please see my attached note.   Winter Weber MD FACS [facility]--Surgery

## (undated) NOTE — LETTER
Wade Levi, :1980    CONSENT FOR PROCEDURE/SEDATION    1. I authorize the performance upon Wade Levi  the following: Labial cyst drain    2.  I authorize Dr. Bessy Gerard MD (and whomever is designated as the doctor’s assistant), to Kim Lofton Witness: _________________________________________ Date:___________     Physician Signature: _______________________________ Date:___________

## (undated) NOTE — MR AVS SNAPSHOT
[facility], [facility]  [address], [facility] [address] [place] [address] [phone]               Thank you for choosing us for your health care visit with TRUONG Michel.   We are glad to serve you and happy to provide you with this sum Results of Recent Testing       Johnt                  Visit [facility] online at  [email or web address]

## (undated) NOTE — MR AVS SNAPSHOT
After Visit Summary   11/2/2020    Heather Oswald    MRN: [id number]           Visit Information     Date & Time  11/2/2020  5:00 PM Provider  TRUONG Jordan Department  [facility], [address]  Dept.  Phone  [phone] 3. Enter your [employer] Activation Code exactly as it appears below. You will not need to use this code after you have completed the sign-up process. If you do not sign up before the expiration date, you must request a new code.     [employer] Activation Code: 5 non-emergency, consider your options before heading to an ER. VIDEO VISITS  Visit face-to-face with a [facility] physician or   ABDIEL using your mobile device or computer   using [employer].    e-VISITS  Communicate with a [facility] Physician or ABDIEL online.  The physician veronica

## (undated) NOTE — MR AVS SNAPSHOT
After Visit Summary   2/1/2017    Jorje Hidalgo    MRN: [id number]           Visit Information        Provider Department Dept Phone    2/1/2017  1:00 PM TRUONG Costello [phone]      Your Vitals Were     BP Pulse Ht Wt B experience and are looking for ways to make improvements. Your feedback will help us do so. For more information on [employer], please visit [email or web address]

## (undated) NOTE — LETTER
Maverick Andrade, :1980    CONSENT FOR PROCEDURE/SEDATION    1. I authorize the performance upon Maverick Andrade  the following: Mirena insertion    2.  I authorize TRUONG Russo (and whomever is designated as the doctor’s assistant), to p Witness: _________________________________________ Date:___________     Physician Signature: _______________________________ Date:___________